# Patient Record
Sex: MALE | Race: ASIAN | URBAN - METROPOLITAN AREA
[De-identification: names, ages, dates, MRNs, and addresses within clinical notes are randomized per-mention and may not be internally consistent; named-entity substitution may affect disease eponyms.]

---
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
## 2024-02-15 ENCOUNTER — TELEMEDICINE (OUTPATIENT)
Dept: FAMILY MEDICINE CLINIC | Facility: CLINIC | Age: 19
End: 2024-02-15
Payer: COMMERCIAL

## 2024-02-15 DIAGNOSIS — G89.29 CHRONIC RIGHT SHOULDER PAIN: Primary | ICD-10-CM

## 2024-02-15 DIAGNOSIS — M25.511 CHRONIC RIGHT SHOULDER PAIN: Primary | ICD-10-CM

## 2024-02-15 NOTE — PROGRESS NOTES
Subjective:   Patient ID: Jony Grimes is a 19 year old male.    Pain in left shoulder x 1 year. Pain with working out, pain with raising the arm.  Problem has been worsening.      History/Other:   Review of Systems   All other systems reviewed and are negative.    Current Outpatient Medications   Medication Sig Dispense Refill    Fluticasone Propionate 50 MCG/ACT Nasal Suspension 2 sprays by Each Nare route daily. 1 each 1     Allergies:No Known Allergies    Objective:   Physical Exam  Video visit    Assessment & Plan:   1. Chronic right shoulder pain      Video visit  1. Chronic right shoulder pain  - Physical Therapy Referral - External    Meds This Visit:  Requested Prescriptions      No prescriptions requested or ordered in this encounter     Imaging & Referrals:  PHYSICAL THERAPY EXTERNAL

## 2024-02-20 ENCOUNTER — TELEPHONE (OUTPATIENT)
Dept: FAMILY MEDICINE CLINIC | Facility: CLINIC | Age: 19
End: 2024-02-20

## 2024-02-20 DIAGNOSIS — G89.29 CHRONIC RIGHT SHOULDER PAIN: Primary | ICD-10-CM

## 2024-02-20 DIAGNOSIS — M25.511 CHRONIC RIGHT SHOULDER PAIN: Primary | ICD-10-CM

## 2024-02-20 NOTE — TELEPHONE ENCOUNTER
Order placed after [facility] was not an approved facility by insurance company    Mother notified.

## 2024-02-23 ENCOUNTER — TELEPHONE (OUTPATIENT)
Dept: PHYSICAL THERAPY | Facility: HOSPITAL | Age: 19
End: 2024-02-23

## 2024-02-27 ENCOUNTER — TELEPHONE (OUTPATIENT)
Dept: PHYSICAL THERAPY | Facility: HOSPITAL | Age: 19
End: 2024-02-27

## 2024-03-01 ENCOUNTER — OFFICE VISIT (OUTPATIENT)
Facility: LOCATION | Age: 19
End: 2024-03-01
Attending: FAMILY MEDICINE
Payer: COMMERCIAL

## 2024-03-01 DIAGNOSIS — G89.29 CHRONIC RIGHT SHOULDER PAIN: Primary | ICD-10-CM

## 2024-03-01 DIAGNOSIS — M25.511 CHRONIC RIGHT SHOULDER PAIN: Primary | ICD-10-CM

## 2024-03-01 PROCEDURE — 97110 THERAPEUTIC EXERCISES: CPT

## 2024-03-01 PROCEDURE — 97162 PT EVAL MOD COMPLEX 30 MIN: CPT

## 2024-03-01 NOTE — PROGRESS NOTES
INITIAL EVALUATION:   Referring Physician: Dr. Riley  Diagnosis: Chronic right shoulder pain (M25.511,G89.29)        Date of Service: 3/1/2024     PATIENT SUMMARY/ASSESSMENT   Jony Grimes is a 19 year old y/o male who presents to therapy today with complaints of left shoulder pain. He reports initial onset of symptoms approximately 1 year ago. He reports he had general soreness/pain in the left shoulder with exercise/lifting weights and had one episode of sharper pain while working out.    Pain: Present 0/10  Best 0/10  Worst 8/10  The patient reports intermittent complaints of anterior left shoulder pain. He reports his symptoms are aggravated with lifting weights overhead, reaching behind his back. He reports his symptoms are eased with rest    Jony Grimes presents with impaired joint mobility, motor function, muscle performance, and range of motion associated with right shoulder pain  There are 1 personal factors and co-morbidities influencing plan of care, including chronic nature of symptoms, making treatment more difficult  There are 3 or more Body Structures/Functions, Activity Limitations and Participation Restrictions, including poor posture, decreased scapular strength, difficulty with overhead pressing  Clinical Presentation: Evolving   Jony describes prior level of function as independent with ADLs, self-care tasks and exercising without significant complaints of pain. Pt goals include decreasing his complaints of pain and returning to his previous level of function.  Past medical history was reviewed with Jony and there are no significant findings  Jony would benefit from skilled Physical Therapy to address the above impairments to decrease complaints of pain and facilitate return to previous level of function    Precautions:  None  OBJECTIVE:   Observation/Posture: The patient sits with poor posture with a forward head and rounded shoulders    Palpation: Moderate tenderness with palpation of the  long head of the biceps tendon    ROM: Seated shoulder AROM WFL with minimal pain noted with active shoulder abduction    Accessory motion: No significant hypomobility with GH joint AP/inferior glides    Flexibility: Minimal limitations in bilateral pectorals    Strength/MMT: Shoulder flexors 5/5, Shoulder abductors 5/5, Shoulder ER 5/5, Shoulder IR 5/5, Biceps 5/5, Triceps 5/5    Special tests: Speeds test positive, Empty can negative, Sanders Fran negative    QuickDASH Outcome Score  Score: 36.36 % (3/1/2024  2:54 PM)       Today’s Treatment and Response: Reviewed plan of care and role of physical therapy. Performed S/L sleeper stretch, prone scap retraction, Theraband shoulder ER and reviewed importance of posture and limiting painful activities/exercises    Charges: PT Eval Moderate complexity x1, TherEx x 1      Total Timed Treatment: 10 min     Total Treatment Time: 35 min     PLAN OF CARE:    Goals:  (To be met in 8 visits)  Patient to demonstrate independence and compliance with comprehensive home exercise program   Patient to demonstrate improved postural awareness, requiring no cueing during treatment session   Patient to improve QuickDash score to be better than 10%  Patient to report returning to regular exercise routine without complaints of shoulder pain    Frequency / Duration: Patient will be seen for 1-2 x/week or a total of 8 visits over a 90 day period. Treatment will include: Manual Therapy; Therapeutic Exercises; Neuromuscular Re-education; Therapeutic Activity; Electrical Stim; Ultrasound;     Education or treatment limitation: None  Rehab Potential:good      Patient/Family/Caregiver was advised of these findings, precautions, and treatment options and has agreed to actively participate in planning and for this course of care.    Thank you for your referral. Please co-sign or sign and return this letter via fax as soon as possible to [phone]. If you have any questions, please contact me  at Dept: [phone]    Sincerely,  Electronically signed by therapist: Raheem Taylor PT    Physician's certification required: Yes  I certify the need for these services furnished under this plan of treatment and while under my care.    X___________________________________________________ Date____________________    Certification From: 3/1/2024  To:5/30/2024

## 2024-03-08 ENCOUNTER — TELEPHONE (OUTPATIENT)
Dept: PHYSICAL THERAPY | Facility: HOSPITAL | Age: 19
End: 2024-03-08

## 2024-03-08 ENCOUNTER — APPOINTMENT (OUTPATIENT)
Facility: LOCATION | Age: 19
End: 2024-03-08
Attending: FAMILY MEDICINE
Payer: COMMERCIAL

## 2024-03-15 ENCOUNTER — TELEMEDICINE (OUTPATIENT)
Dept: FAMILY MEDICINE CLINIC | Facility: CLINIC | Age: 19
End: 2024-03-15
Payer: COMMERCIAL

## 2024-03-15 DIAGNOSIS — G89.29 CHRONIC PAIN OF BOTH SHOULDERS: ICD-10-CM

## 2024-03-15 DIAGNOSIS — R29.898 WEAKNESS OF LEFT SHOULDER: Primary | ICD-10-CM

## 2024-03-15 DIAGNOSIS — M25.511 CHRONIC PAIN OF BOTH SHOULDERS: ICD-10-CM

## 2024-03-15 DIAGNOSIS — L65.9 HAIR THINNING: ICD-10-CM

## 2024-03-15 DIAGNOSIS — M25.512 CHRONIC PAIN OF BOTH SHOULDERS: ICD-10-CM

## 2024-03-15 PROCEDURE — 99214 OFFICE O/P EST MOD 30 MIN: CPT | Performed by: FAMILY MEDICINE

## 2024-03-15 NOTE — PROGRESS NOTES
Subjective:   Patient ID: Jony Grimes is a 19 year old male.    Bilateral shoulder pain, worse on left.  Chronic.  Summer 2022 was doing shoulder presses on cruise and boat moved.  He had a crack/pop.  And since then has been having this pain.  + weakness in shoulder.    + hair thinning.    History/Other:   Review of Systems   All other systems reviewed and are negative.    Current Outpatient Medications   Medication Sig Dispense Refill    Fluticasone Propionate 50 MCG/ACT Nasal Suspension 2 sprays by Each Nare route daily. 1 each 1     Allergies:No Known Allergies    Objective:   Physical Exam  Video visit    Assessment & Plan:   1. Weakness of left shoulder    2. Chronic pain of both shoulders    3. Hair thinning      Video visit  Shoulder injury, chronic pain, weakness.  Has tried PT.  Should get MRI.  Get blood test dont around 8am  Use rogaine  Refer do dermatologist if concerns or not improving.    Meds This Visit:  Requested Prescriptions      No prescriptions requested or ordered in this encounter       Imaging & Referrals:  DERM - INTERNAL  MRI SHOULDER, LEFT (CPT=73221)

## 2024-05-09 ENCOUNTER — OFFICE VISIT (OUTPATIENT)
Dept: FAMILY MEDICINE CLINIC | Facility: CLINIC | Age: 19
End: 2024-05-09
Payer: COMMERCIAL

## 2024-05-09 VITALS
BODY MASS INDEX: 25.23 KG/M2 | RESPIRATION RATE: 18 BRPM | SYSTOLIC BLOOD PRESSURE: 112 MMHG | WEIGHT: 157 LBS | HEART RATE: 67 BPM | HEIGHT: 66 IN | DIASTOLIC BLOOD PRESSURE: 70 MMHG | OXYGEN SATURATION: 99 %

## 2024-05-09 DIAGNOSIS — M25.512 CHRONIC LEFT SHOULDER PAIN: ICD-10-CM

## 2024-05-09 DIAGNOSIS — R29.898 WEAKNESS OF LEFT SHOULDER: Primary | ICD-10-CM

## 2024-05-09 DIAGNOSIS — G89.29 CHRONIC LEFT SHOULDER PAIN: ICD-10-CM

## 2024-05-09 PROCEDURE — 3074F SYST BP LT 130 MM HG: CPT | Performed by: FAMILY MEDICINE

## 2024-05-09 PROCEDURE — 3078F DIAST BP <80 MM HG: CPT | Performed by: FAMILY MEDICINE

## 2024-05-09 PROCEDURE — 3008F BODY MASS INDEX DOCD: CPT | Performed by: FAMILY MEDICINE

## 2024-05-09 PROCEDURE — 99213 OFFICE O/P EST LOW 20 MIN: CPT | Performed by: FAMILY MEDICINE

## 2024-05-09 NOTE — PROGRESS NOTES
Subjective:   Patient ID: Jony Grimes is a 19 year old male.    Left shoulder pain chronic.  Creaking in b/l shoulder.  Was lifting while on cruise and ship tilted, and he heard a crack.  And then kept lifting.  Now improving with rest.  Still hurts if he goes hard.  Has dont PT.        History/Other:   Review of Systems   All other systems reviewed and are negative.    No current outpatient medications on file.     Allergies:No Known Allergies    Objective:   Physical Exam  Vitals reviewed.   Constitutional:       General: He is not in acute distress.     Appearance: He is well-developed. He is not diaphoretic.   Eyes:      General: No scleral icterus.        Right eye: No discharge.         Left eye: No discharge.      Conjunctiva/sclera: Conjunctivae normal.   Cardiovascular:      Rate and Rhythm: Normal rate and regular rhythm.      Heart sounds: Normal heart sounds.   Pulmonary:      Effort: Pulmonary effort is normal. No respiratory distress.      Breath sounds: Normal breath sounds. No wheezing or rales.   Genitourinary:     Comments: Rotator cuff testing normal.  Creaking and pain with compression testing into labrum.        Assessment & Plan:   1. Weakness of left shoulder    2. Chronic left shoulder pain        Concern for possible labrum tear  Awaiting MRI results  Rest, ice, gentle stretching and strengthening.    Meds This Visit:  Requested Prescriptions      No prescriptions requested or ordered in this encounter       Imaging & Referrals:  None

## 2024-05-21 ENCOUNTER — HOSPITAL ENCOUNTER (OUTPATIENT)
Dept: MRI IMAGING | Facility: HOSPITAL | Age: 19
Discharge: HOME | End: 2024-05-21
Attending: FAMILY MEDICINE

## 2024-05-21 DIAGNOSIS — R29.898 WEAKNESS OF LEFT SHOULDER: ICD-10-CM

## 2024-05-21 DIAGNOSIS — G89.29 CHRONIC PAIN OF BOTH SHOULDERS: ICD-10-CM

## 2024-05-21 DIAGNOSIS — M25.512 CHRONIC PAIN OF BOTH SHOULDERS: ICD-10-CM

## 2024-05-21 DIAGNOSIS — M25.511 CHRONIC PAIN OF BOTH SHOULDERS: ICD-10-CM

## 2024-05-21 PROCEDURE — 73221 MRI JOINT UPR EXTREM W/O DYE: CPT | Performed by: FAMILY MEDICINE

## 2024-05-28 ENCOUNTER — OFFICE VISIT (OUTPATIENT)
Dept: FAMILY MEDICINE CLINIC | Facility: CLINIC | Age: 19
End: 2024-05-28

## 2024-05-28 VITALS
WEIGHT: 153 LBS | OXYGEN SATURATION: 97 % | HEART RATE: 74 BPM | BODY MASS INDEX: 25 KG/M2 | SYSTOLIC BLOOD PRESSURE: 104 MMHG | DIASTOLIC BLOOD PRESSURE: 58 MMHG

## 2024-05-28 DIAGNOSIS — Z71.85 HPV VACCINE COUNSELING: ICD-10-CM

## 2024-05-28 DIAGNOSIS — S46.112A LABRAL TEAR OF LONG HEAD OF BICEPS TENDON, LEFT, INITIAL ENCOUNTER: Primary | ICD-10-CM

## 2024-05-28 DIAGNOSIS — N47.1 PHIMOSIS: ICD-10-CM

## 2024-05-28 DIAGNOSIS — J30.2 SEASONAL ALLERGIC RHINITIS, UNSPECIFIED TRIGGER: ICD-10-CM

## 2024-05-28 PROCEDURE — 90471 IMMUNIZATION ADMIN: CPT | Performed by: FAMILY MEDICINE

## 2024-05-28 PROCEDURE — 3078F DIAST BP <80 MM HG: CPT | Performed by: FAMILY MEDICINE

## 2024-05-28 PROCEDURE — 90651 9VHPV VACCINE 2/3 DOSE IM: CPT | Performed by: FAMILY MEDICINE

## 2024-05-28 PROCEDURE — 3074F SYST BP LT 130 MM HG: CPT | Performed by: FAMILY MEDICINE

## 2024-05-28 PROCEDURE — 99214 OFFICE O/P EST MOD 30 MIN: CPT | Performed by: FAMILY MEDICINE

## 2024-05-28 RX ORDER — FLUTICASONE PROPIONATE 50 MCG
2 SPRAY, SUSPENSION (ML) NASAL DAILY
Qty: 1 EACH | Refills: 1 | Status: SHIPPED | OUTPATIENT
Start: 2024-05-28

## 2024-05-28 RX ORDER — AZELASTINE 1 MG/ML
1 SPRAY, METERED NASAL 2 TIMES DAILY
Qty: 1 EACH | Refills: 1 | Status: SHIPPED | OUTPATIENT
Start: 2024-05-28

## 2024-05-29 NOTE — PROGRESS NOTES
Subjective:   Patient ID: Jony Grimes is a 19 year old male.    1. Labral tear of long head of bicep tendon insertion seen on MRI.  + pain with lifting.  Started due to lifting.  No swelling.  Has given it rest and having some improvement in pain.  Able to do pulling exercises but not lifting.    Seasonal allergic rhinitis, unspecified trigger.  Chronic.  Worse in spring.  Doing zyrtec but not helping enough.  No other treatments.    Phimosis.  Chronic.  Not improving.  No pain at this time.  + mild pain with erections.        History/Other:   Review of Systems   All other systems reviewed and are negative.    Current Outpatient Medications   Medication Sig Dispense Refill    fluticasone propionate 50 MCG/ACT Nasal Suspension 2 sprays by Each Nare route daily. 1 each 1    azelastine 0.1 % Nasal Solution 1 spray by Nasal route 2 (two) times daily. 1 each 1     Allergies:No Known Allergies    Objective:   Physical Exam  Vitals reviewed.   Constitutional:       General: He is not in acute distress.     Appearance: He is well-developed. He is not diaphoretic.   Eyes:      General: No scleral icterus.        Right eye: No discharge.         Left eye: No discharge.      Conjunctiva/sclera: Conjunctivae normal.   Cardiovascular:      Rate and Rhythm: Normal rate and regular rhythm.      Heart sounds: Normal heart sounds.   Pulmonary:      Effort: Pulmonary effort is normal. No respiratory distress.      Breath sounds: Normal breath sounds. No wheezing or rales.   Genitourinary:     Comments: + phimosis.  Painful when trying to pull it back.  Musculoskeletal:      Comments: See previous visit         Assessment & Plan:   1. Labral tear of long head of biceps tendon, left, initial encounter    2. Seasonal allergic rhinitis, unspecified trigger    3. HPV vaccine counseling    4. Phimosis      1. Labral tear of long head of biceps tendon, left, initial encounter  - Ortho Referral - In Network    2. Seasonal allergic rhinitis,  unspecified trigger  - fluticasone propionate 50 MCG/ACT Nasal Suspension; 2 sprays by Each Nare route daily.  Dispense: 1 each; Refill: 1  - azelastine 0.1 % Nasal Solution; 1 spray by Nasal route 2 (two) times daily.  Dispense: 1 each; Refill: 1    3. HPV vaccine counseling  - HPV (Gardasil 9)    4. Phimosis  - Urology Referral - In Network    Orders Placed This Encounter   Procedures    HPV (Gardasil 9)       Meds This Visit:  Requested Prescriptions     Signed Prescriptions Disp Refills    fluticasone propionate 50 MCG/ACT Nasal Suspension 1 each 1     Si sprays by Each Nare route daily.    azelastine 0.1 % Nasal Solution 1 each 1     Si spray by Nasal route 2 (two) times daily.       Imaging & Referrals:  ORTHOPEDIC - INTERNAL  UROLOGY - INTERNAL  HPV HUMAN PAPILLOMA VIRUS VACC 9 MORIS 3 DOSE IM

## 2024-06-24 ENCOUNTER — TELEPHONE (OUTPATIENT)
Dept: PHYSICAL THERAPY | Facility: HOSPITAL | Age: 19
End: 2024-06-24

## 2024-06-27 ENCOUNTER — ORDER TRANSCRIPTION (OUTPATIENT)
Dept: PHYSICAL THERAPY | Facility: HOSPITAL | Age: 19
End: 2024-06-27

## 2024-06-27 DIAGNOSIS — S43.432A ANTERIOR TO POSTERIOR TEAR OF SUPERIOR GLENOID LABRUM OF LEFT SHOULDER: Primary | ICD-10-CM

## 2024-07-03 ENCOUNTER — TELEPHONE (OUTPATIENT)
Dept: PHYSICAL THERAPY | Facility: HOSPITAL | Age: 19
End: 2024-07-03

## 2024-07-09 ENCOUNTER — TELEPHONE (OUTPATIENT)
Dept: PHYSICAL THERAPY | Age: 19
End: 2024-07-09

## 2024-07-10 ENCOUNTER — TELEPHONE (OUTPATIENT)
Dept: PHYSICAL THERAPY | Facility: HOSPITAL | Age: 19
End: 2024-07-10

## 2024-07-23 ENCOUNTER — OFFICE VISIT (OUTPATIENT)
Dept: PHYSICAL THERAPY | Age: 19
End: 2024-07-23
Attending: PHYSICIAN ASSISTANT
Payer: COMMERCIAL

## 2024-07-23 DIAGNOSIS — S43.432A ANTERIOR TO POSTERIOR TEAR OF SUPERIOR GLENOID LABRUM OF LEFT SHOULDER: Primary | ICD-10-CM

## 2024-07-23 PROCEDURE — 97110 THERAPEUTIC EXERCISES: CPT

## 2024-07-23 PROCEDURE — 97161 PT EVAL LOW COMPLEX 20 MIN: CPT

## 2024-07-23 NOTE — PROGRESS NOTES
SHOULDER EVALUATION:     Diagnosis:   Anterior to posterior tear of superior glenoid labrum of left shoulder (S43.432A)       Referring Provider: Dre Bowens  Date of Evaluation:    7/23/2024    Precautions:  None Next MD visit:   none scheduled  Date of Surgery: n/a     PATIENT SUMMARY   Jony Grimes is a 19 year old male who presents to therapy today with complaints of left shoulder pain x1-2 years.  States that he was working out with weights. Squatting with a 225# bar behind his back and his shoulders would be really sore afterwards. Overtime it started to hurt to lift even low weights (30#). Went to see orthopedic> MRA: showed SLAP tear. Referral to OP PT. Has been using right arm more for activities and right shoulder has been more sore recently    Pt describes pain level current 0/10, at best 0/10, at worst 8/10.   Current functional limitations include difficulty with squatting with a bar, swimming and stroke, washing back in shower, throwing/shooting basketball.     Jony describes prior level of function declining use of left shoulder for lifting. Pt goals include get shoulder back to normal and workout without  pain.  Past medical history was reviewed with Jony. Significant findings include No past medical history on file.      ASSESSMENT  Jony presents to physical therapy evaluation with primary c/o bilat sh pain L>R. The results of the objective tests and measures show impaired posture, flexibility, ROM, joint mobility, muscle performance and motor function.  Functional deficits include but are not limited to difficulty with squatting with a bar, swimming and stroke, washing back in shower, throwing/shooting basketball.  Signs and symptoms are consistent with diagnosis of left  shoulder SLAP tear. Pt and PT discussed evaluation findings, pathology, POC and HEP.  Pt voiced understanding and performs HEP correctly without reported pain. Skilled Physical Therapy is medically necessary to address  the above impairments and reach functional goals.     OBJECTIVE:   Observation/Posture: rounded shoulder posture  Palpation: mild bicep tenderness  Sensation: Sensation intact to light touch.    AROM: (* denotes performed with pain)  Shoulder  Elbow   Flexion: L 168 deg; R 168 deg  Abduction: L 170 deg; R 180 deg  ER: L 85 deg; R 90 deg  IR: L T5; R T6 WNL     Accessory motion: GHJ: slight hypomobility AP    Flexibility: pectorals: Moderate restrictions    Strength/MMT: (* denotes performed with pain)  Shoulder Elbow Scapular   Flexion: R 5/5; L 5/5  Abduction: R 5/5; L 5/5  ER: R 5/5; L 4+/5  IR: R 5/5; L 4+/5 Flexion: R 5/5; L 5/5  Extension: R 5/5; L 5/5   Rhomboids: R4+/5, L 4+/5  Mid trap: R 4+/5; L 4+/5        Special tests:   NT due to recent MRI    Today’s Treatment and Response: PT eval. HEP taught, performed and issued  Pt education was provided on exam findings, treatment diagnosis, treatment plan, expectations, and prognosis. Pt was also provided recommendations for activity modifications, possible soreness after evaluation, modalities as needed [ice/heat], postural corrections, and ergonomics.  Patient was instructed in and issued a HEP for:   Access Code: [email or web address]  URL: [email or web address]  Date: 07/23/2024  Prepared by: Liz Riley    Exercises  - Full Plank with Shoulder Taps  - 1 x daily - 7 x weekly - 3 sets - 10 reps  - Standing Wall Ball Circles with Mini Swiss Ball  - 1 x daily - 7 x weekly - 1 sets - 20 reps  - Standing Wall Ball Circles in Scaption with Mini Swiss Ball  - 1 x daily - 7 x weekly - 20 reps  - Shoulder Flexion Wall Slide with Resistance Band  - 1-2 x daily - 7 x weekly - 1 sets - 10 reps - 2 sec hold  - Wall Push Up  - 1-2 x daily - 7 x weekly - 1 sets - 10 reps - 2 sec hold  - Horizontal Wall Walk with Resistance  - 1 x daily - 7 x weekly    Charges: PT Eval Low Complexity, Ex 1      Total Timed Treatment: 15 min     Total Treatment Time: 45 min     Based on  clinical rationale and outcome measures, this evaluation involved Low Complexity decision making   PLAN OF CARE:    Goals: (to be met in 10 visits)   Pt will report improved ability to sleep without waking due to shoulder pain   Pt will improve shoulder abduction AROM to >180 degrees to improve ability to don deodorant, don/doff shirts, and wash hair   Pt will increase shoulder AROM ER to 90 deg to be able to reach and fasten seatbelt   Pt will improve shoulder strength throughout to 5/5 to improve function with overhead lifting   Pt will demonstrate increased mid/low trap strength to 5-/5 to promote improved shoulder mechanics and stabilization with lifting and reaching   Pt will be independent and compliant with comprehensive HEP to maintain progress achieved in PT       Frequency / Duration: Patient will be seen for 1-2 x/week or a total of 10 visits over a 90 day period. Treatment will include: Manual Therapy, Neuromuscular Re-education, Therapeutic Exercise, and Home Exercise Program instruction    Education or treatment limitation: None  Rehab Potential:good    QuickDASH Outcome Score  Score: 36.36 % (7/23/2024  2:47 PM)      Patient/Family/Caregiver was advised of these findings, precautions, and treatment options and has agreed to actively participate in planning and for this course of care.    Thank you for your referral. Please co-sign or sign and return this letter via fax as soon as possible to [phone]. If you have any questions, please contact me at Dept: [phone]    Sincerely,  Electronically signed by therapist: Liz Riley, PT  Physician's certification required: Yes  I certify the need for these services furnished under this plan of treatment and while under my care.    X___________________________________________________ Date____________________    Certification From: 7/23/2024  To:10/21/2024

## 2024-07-23 NOTE — PATIENT INSTRUCTIONS
Access Code: [email or web address]  URL: [email or web address]  Date: 07/23/2024  Prepared by: Liz Riley    Exercises  - Full Plank with Shoulder Taps  - 1 x daily - 7 x weekly - 3 sets - 10 reps  - Standing Wall Ball Circles with Mini Swiss Ball  - 1 x daily - 7 x weekly - 1 sets - 20 reps  - Standing Wall Ball Circles in Scaption with Mini Swiss Ball  - 1 x daily - 7 x weekly - 20 reps  - Shoulder Flexion Wall Slide with Resistance Band  - 1-2 x daily - 7 x weekly - 1 sets - 10 reps - 2 sec hold  - Wall Push Up  - 1-2 x daily - 7 x weekly - 1 sets - 10 reps - 2 sec hold  - Horizontal Wall Walk with Resistance  - 1 x daily - 7 x weekly

## 2024-07-30 ENCOUNTER — APPOINTMENT (OUTPATIENT)
Dept: PHYSICAL THERAPY | Age: 19
End: 2024-07-30
Attending: PHYSICIAN ASSISTANT
Payer: COMMERCIAL

## 2024-08-05 ENCOUNTER — OFFICE VISIT (OUTPATIENT)
Dept: PHYSICAL THERAPY | Age: 19
End: 2024-08-05
Attending: PHYSICIAN ASSISTANT
Payer: COMMERCIAL

## 2024-08-05 PROCEDURE — 97110 THERAPEUTIC EXERCISES: CPT

## 2024-08-05 NOTE — PROGRESS NOTES
Diagnosis:   Anterior to posterior tear of superior glenoid labrum of left shoulder (S43.432A)         Referring Provider: Dre Bowens  Date of Evaluation:    7/23/2024    Precautions:  None Next MD visit:   none scheduled  Date of Surgery: n/a   Insurance Primary/Secondary: [employer] [place] / N/A     # Auth Visits: 5 authorized            Subjective: no new problems. States that he is working on his HEP 1x/day. Shoulder does feel a little better overall.     Pain: 0/10      Objective:   AROM: (* denotes performed with pain)  Shoulder    Flexion: L 168 deg; R 168 deg  Abduction: L 170 deg; R 180 deg  ER: L 85 deg; R 90 deg  IR: L T5; R T6     Strength/MMT: (* denotes performed with pain)  Shoulder Elbow Scapular   Flexion: R 5/5; L 5/5  Abduction: R 5/5; L 5/5  ER: R 5/5; L 4+/5  IR: R 5/5; L 4+/5 Flexion: R 5/5; L 5/5  Extension: R 5/5; L 5/5    Rhomboids: R4+/5, L 4+/5  Mid trap: R 4+/5; L 4+/5         Assessment: completed todays treatment without c/o increased pain or discomfort following treatment. Reviewed HEP and pt required cues for proper form with wall push ups, modified plank alt shoulder taps to perform on knees due to increased difficulty in plank. PT works out at gym 4-5 days/week, taught TRX low and high rows--added to HEP so he can work on at the gym.      Goals:    (to be met in 10 visits)   Pt will report improved ability to sleep without waking due to shoulder pain   Pt will improve shoulder abduction AROM to >180 degrees to improve ability to don deodorant, don/doff shirts, and wash hair   Pt will increase shoulder AROM ER to 90 deg to be able to reach and fasten seatbelt   Pt will improve shoulder strength throughout to 5/5 to improve function with overhead lifting   Pt will demonstrate increased mid/low trap strength to 5-/5 to promote improved shoulder mechanics and stabilization with lifting and reaching   Pt will be independent and compliant with comprehensive HEP to maintain progress  achieved in PT    Plan: continue POC. Progress as tolerated  Date: 8/5/2024  TX#: 2/10 Date:                 TX#: 3/ Date:                 TX#: 4/ Date:                 TX#: 5/ Date:   Tx#: 6/   UBE x4 min       TRX low rows 2x10  TRX high rows 2x10       Wall press ups YSB x15       Serratus wall slide YTB x10       Serratus lateral wall walking YTB 10ft x 2       Wall circles CW/CCW 2x10 ea in flexion and abd       Quadruped: L/R CW/CCW circles with gliders x10 CW/CCW       Quadruped  alt sh taps x10 ea       GTB R/L sh IR/ER x10 ea       HEP: Access Code: [email or web address]  URL: [email or web address]  Date: 07/23/2024  Prepared by: Liz Riley     Exercises  - Full Plank with Shoulder Taps  - 1 x daily - 7 x weekly - 3 sets - 10 reps  - Standing Wall Ball Circles with Mini Swiss Ball  - 1 x daily - 7 x weekly - 1 sets - 20 reps  - Standing Wall Ball Circles in Scaption with Mini Swiss Ball  - 1 x daily - 7 x weekly - 20 reps  - Shoulder Flexion Wall Slide with Resistance Band  - 1-2 x daily - 7 x weekly - 1 sets - 10 reps - 2 sec hold  - Wall Push Up  - 1-2 x daily - 7 x weekly - 1 sets - 10 reps - 2 sec hold  - Horizontal Wall Walk with Resistance  - 1 x daily - 7 x weekly     8/5/2024  TRX low row  TRX high row    Charges: Ex 3 (45)       Total Timed Treatment: 45 min  Total Treatment Time: 45 min

## 2024-08-07 ENCOUNTER — APPOINTMENT (OUTPATIENT)
Dept: PHYSICAL THERAPY | Age: 19
End: 2024-08-07
Attending: PHYSICIAN ASSISTANT
Payer: COMMERCIAL

## 2024-08-12 ENCOUNTER — APPOINTMENT (OUTPATIENT)
Dept: PHYSICAL THERAPY | Age: 19
End: 2024-08-12
Attending: PHYSICIAN ASSISTANT
Payer: COMMERCIAL

## 2024-08-14 ENCOUNTER — TELEPHONE (OUTPATIENT)
Dept: FAMILY MEDICINE CLINIC | Facility: CLINIC | Age: 19
End: 2024-08-14

## 2024-08-14 DIAGNOSIS — L65.9 HAIR THINNING: Primary | ICD-10-CM

## 2024-08-14 NOTE — TELEPHONE ENCOUNTER
Please review previous message  Pt requesting script for finasteride for hail loss.  Last ov 5/28/24

## 2024-08-14 NOTE — TELEPHONE ENCOUNTER
Pt requesting finasteride script to be sent to [facility] on [address].  Pt said this has been discussed in the past for hair loss.  Pls advise

## 2024-08-16 RX ORDER — FINASTERIDE 1 MG/1
1 TABLET, FILM COATED ORAL DAILY
Qty: 90 TABLET | Refills: 3 | Status: SHIPPED | OUTPATIENT
Start: 2024-08-16

## 2024-09-05 ENCOUNTER — TELEPHONE (OUTPATIENT)
Dept: PHYSICAL THERAPY | Facility: HOSPITAL | Age: 19
End: 2024-09-05

## 2024-09-05 ENCOUNTER — APPOINTMENT (OUTPATIENT)
Dept: PHYSICAL THERAPY | Age: 19
End: 2024-09-05
Attending: PHYSICIAN ASSISTANT
Payer: COMMERCIAL

## 2024-12-17 ENCOUNTER — PATIENT MESSAGE (OUTPATIENT)
Dept: FAMILY MEDICINE CLINIC | Facility: CLINIC | Age: 19
End: 2024-12-17

## 2025-01-30 ENCOUNTER — TELEPHONE (OUTPATIENT)
Dept: FAMILY MEDICINE CLINIC | Facility: CLINIC | Age: 20
End: 2025-01-30

## 2025-01-30 NOTE — TELEPHONE ENCOUNTER
Patients mother called to report patients symptoms:  Severe cough, throwing up, nose is blocked.     She is requesting an appointment today or tomorrow but I told her we do not have availability with [facility] at this time. She wanted us to update [facility] on patients symptoms

## 2025-01-30 NOTE — TELEPHONE ENCOUNTER
Spoke to pt mom  Pt started yesterday with cough, sinus congestion, vomiting    [facility], verbal understanding

## 2025-03-10 ENCOUNTER — TELEPHONE (OUTPATIENT)
Dept: FAMILY MEDICINE CLINIC | Facility: CLINIC | Age: 20
End: 2025-03-10

## 2025-03-10 NOTE — TELEPHONE ENCOUNTER
Spoke to mother, states 3 weeks ago, pt had cold/fever sx, did not test for covid at that time, but did test this past weekend and it was negative.  Has lost sense of taste x 2weeks. Requesting appt with Osvaldo Mckeon.  Add on?

## 2025-03-10 NOTE — TELEPHONE ENCOUNTER
Mom asking for pt to be added to Dr. Riley's sched - he has lost the sense of taste for the last 2 wks.  Dr. Riley has no openings for the remainder of March and mom does not want to wait.  Pls advise

## 2025-03-11 NOTE — TELEPHONE ENCOUNTER
Ok to see him Monday but most likely COVID and we will have to wait and see, but have him come in and I can examine and see if he needs any testing.

## 2025-03-11 NOTE — TELEPHONE ENCOUNTER
LM for pt to cb--mom is not on HIPAA  See below.  There is a fast pass opening so far on Friday avail but not sure for how long

## 2025-06-19 ENCOUNTER — OFFICE VISIT (OUTPATIENT)
Dept: FAMILY MEDICINE CLINIC | Facility: CLINIC | Age: 20
End: 2025-06-19
Payer: COMMERCIAL

## 2025-06-19 VITALS
HEART RATE: 72 BPM | BODY MASS INDEX: 23.78 KG/M2 | RESPIRATION RATE: 18 BRPM | HEIGHT: 66 IN | DIASTOLIC BLOOD PRESSURE: 70 MMHG | SYSTOLIC BLOOD PRESSURE: 112 MMHG | WEIGHT: 148 LBS | OXYGEN SATURATION: 98 %

## 2025-06-19 DIAGNOSIS — Z00.00 ANNUAL PHYSICAL EXAM: Primary | ICD-10-CM

## 2025-06-19 DIAGNOSIS — E55.9 VITAMIN D DEFICIENCY: ICD-10-CM

## 2025-06-19 NOTE — H&P
The 21st Century Cures Act makes medical notes like these available to patients in the interest of transparency. Please be advised this is a medical document. Medical documents are intended to carry relevant information, facts as evident, and the clinical opinion of the practitioner. The medical note is intended as peer to peer communication and may appear blunt or direct. It is written in medical language and may contain abbreviations or verbiage that are unfamiliar.     Jony Grimes is a 20 year old male who presents for a complete physical exam.   HPI:   Pt complains of allergy with the cat's fur,chronic, no new symptoms, he is having cat.    + vit D def.  Not taking vit D.  No fractures.  Mild fatigue.         Current Medications[1]   Past Medical History[2]   Past Surgical History[3]   Family History[4]   Social History:  Short Social Hx on File[5]        REVIEW OF SYSTEMS:   GENERAL: feels well otherwise  SKIN: denies any unusual skin lesions  EYES:denies blurred vision or double vision  HEENT: denies nasal congestion, sinus pain or ST, denies changes in hearing or vision  LUNGS: denies shortness of breath with exertion or frequent cough  CV: denies chest pain, pressure or palpitations  GI: denies abdominal pain,denies heartburn, denies chronic diarrhea or constipation  : denies nocturia or changes in stream, denies erectile dysfunction  MS: denies back pain, arthralgias or myalgias  NEURO: denies headaches or dizziness  PSYCH: denies depression or anxiety  HEMATOLOGIC: denies hx of anemia, easy bruising or bleeding  ENDOCRINE:denies frequent thirst or urination, denies unintentional weight gain/loss  ALL/ASTHMA: denies hx of allergy or asthma    EXAM:   /70   Pulse 72   Resp 18   Ht 5' 6\" (1.676 m)   Wt 148 lb   SpO2 98%   BMI 23.89 kg/m²   Body mass index is 23.89 kg/m².     GENERAL: well developed, well nourished,in no apparent distress  SKIN: no rashes,no suspicious lesions  HEENT:  atraumatic, normocephalic, PERRLA, conjunctiva clear, TMs clear, posterior pharynx clear, no congestion  NECK: supple,no adenopathy,no thyromegaly  LUNGS: clear to auscultation, easy breathing  CV: S1S2, RRR without murmur  GI: good BS's;no masses, HSM or tenderness  : two descended testes,no scrotal tenderness or mass, normal penis no lesions, no hernia  MS: Ryley, no bony deformities, gait normal  EXT: no cyanosis, clubbing or edema  NEURO: Oriented times three, strength 5/5 x 4 ext, LE DTRs 2+  PSYCH: mood and affect appropriate    ASSESSMENT AND PLAN:   Jony Grimes is a 20 year old male who presents for a complete physical exam. Recommended heart healthy diet, routine exercise, and annual flu vaccines.  Routine testicular exams reinforced. The patient indicates understanding of these issues and agrees to the plan.  1. Annual physical exam  - Lipid Panel; Future  - CBC With Differential With Platelet; Future  - Comp Metabolic Panel (14); Future  - TSH W Reflex To Free T4; Future  - Vitamin D; Future    2. Vitamin D deficiency  - Vitamin D; Future    Follow up in 1 year.    Orders Placed This Encounter   Procedures    Lipid Panel     Standing Status:   Future     Expected Date:   6/19/2025     Expiration Date:   6/19/2026    CBC With Differential With Platelet     Standing Status:   Future     Expected Date:   6/19/2025     Expiration Date:   6/19/2026    Comp Metabolic Panel (14)     Standing Status:   Future     Expected Date:   6/19/2025     Expiration Date:   6/19/2026    TSH W Reflex To Free T4     Standing Status:   Future     Expected Date:   6/19/2025     Expiration Date:   6/19/2026    Vitamin D     Standing Status:   Future     Expected Date:   6/19/2025     Expiration Date:   6/19/2026     Please pick the scenario that best fits the purpose for ordering this test:   General Screening/Vit D deficiency (25-Hydroxy)     Release to patient:   Immediate             [1]   Current Outpatient Medications    Medication Sig Dispense Refill    fluticasone propionate 50 MCG/ACT Nasal Suspension 2 sprays by Each Nare route daily. 1 each 1    azelastine 0.1 % Nasal Solution 1 spray by Nasal route 2 (two) times daily. 1 each 1   [2] History reviewed. No pertinent past medical history.  [3] History reviewed. No pertinent surgical history.  [4] History reviewed. No pertinent family history.  [5]   Social History  Socioeconomic History    Marital status: Single   Tobacco Use    Smoking status: Never    Smokeless tobacco: Never   Vaping Use    Vaping status: Never Used   Substance and Sexual Activity    Alcohol use: No     Alcohol/week: 0.0 standard drinks of alcohol    Drug use: No

## 2025-08-07 ENCOUNTER — TELEPHONE (OUTPATIENT)
Dept: FAMILY MEDICINE CLINIC | Facility: CLINIC | Age: 20
End: 2025-08-07

## 2025-08-07 DIAGNOSIS — Z23 NEED FOR VACCINATION: Primary | ICD-10-CM

## 2025-08-12 ENCOUNTER — NURSE ONLY (OUTPATIENT)
Dept: FAMILY MEDICINE CLINIC | Facility: CLINIC | Age: 20
End: 2025-08-12

## 2025-08-12 PROCEDURE — 90471 IMMUNIZATION ADMIN: CPT | Performed by: FAMILY MEDICINE

## 2025-08-12 PROCEDURE — 90715 TDAP VACCINE 7 YRS/> IM: CPT | Performed by: FAMILY MEDICINE

## 2025-08-21 ENCOUNTER — E-VISIT (OUTPATIENT)
Dept: TELEHEALTH | Age: 20
End: 2025-08-21

## 2025-08-21 DIAGNOSIS — Z02.89 ENCOUNTER TO OBTAIN EXCUSE FROM WORK: ICD-10-CM

## 2025-08-21 DIAGNOSIS — J22 ACUTE RESPIRATORY INFECTION: Primary | ICD-10-CM

## (undated) NOTE — LETTER
10/24/19        Tete German  [address] [facility]      Dear Sofie Ware,    [address] [facility] records indicate that you have outstanding lab work and or testing that was ordered for you and has not yet been completed:  Orders Placed This Encounter

## (undated) NOTE — LETTER
[facility] [employer] of Child Health Examination       Student's Name  David Ellington Birth Date Title                           Date     Signature COMPLETED AND SIGNED BY PARENT/GUARDIAN AND VERIFIED BY HEALTH CARE PROVIDER    ALLERGIES  (Food, drug, insect, other)  Patient has no known allergies.  MEDICATION  (List all prescribed or taken on a regular basis.)  No current outpatient medications on nayana (Other)   Resp 18   Ht 5' 6\" (1.676 m)   Wt 141 lb   SpO2 98%   BMI 22.76 kg/m²     DIABETES SCREENING  BMI>85% age/sex  No And any two of the following:  Family History No    Ethnic Minority  No          Signs of Insulin Resistance (hypertension, dyslipi Currently Prescribed Asthma Medication:            Quick-relief  medication (e.g. Short Acting Beta Antagonist): No          Controller medication (e.g. inhaled corticosteroid):   No Other   NEEDS/MODIFICATIONS required in the school setting  None DIETARY

## (undated) NOTE — LETTER
Date: 12/3/2019    Patient Name: Mina Liu          To Whom it may concern: This letter has been written at the patient's request. The above patient was seen at the [facility] for treatment of a medical condition.     This patient should

## (undated) NOTE — LETTER
Date: 9/12/2019    Patient Name: Fartun Walter          To Whom it may concern: This letter has been written at the patient's request. The above patient was seen at the [facility] for treatment of a medical condition.     This patient should

## (undated) NOTE — LETTER
Date: 9/30/2019    Patient Name: Chikis Satton          To Whom it may concern: The above patient was seen at the [facility] for treatment of a medical condition.     This patient should be excused from attending school on 09/30/19        Si

## (undated) NOTE — LETTER
Date: 12/12/2019    Patient Name: Ronda Mazariegos          To Whom it may concern: The above patient was seen at the [facility] for treatment of a medical condition. 3  Please allow him handicap parking from 12/12/19 through 12/23/19.   During

## (undated) NOTE — LETTER
Name:  Alisia Hernandez Year:  12th Grade Class: Student ID No.:   Address:  [id number] [place] [address] Phone:  [phone] (home)  :  15year old   Name Relationship [address] Work Phone Home Phone Mobile Phone   1.  Declan Kirby 12. Has anyone in your family had unexplained fainting, seizures, or near drowning? BONE AND JOINT QUESTIONS Yes No   17. Have you ever had an injury to a bone, muscle, ligament, or tendon that caused you to miss a practice or a game?      18. Have you /fall?     36. Have you ever become ill while exercising in the heat?     41. Do you get frequent muscle cramps when exercising? 42. Do you or someone in your family have sickle cell trait or disease? 43.  Have you ever had any problems with your ey impulse (PMI) Yes    Pulses Yes    Lungs Yes    Abdomen Yes    Genitourinary (males only)* Yes    Skin:  HSV, lesions suggestive of MRSA, tinea corporis Yes    Neurologic* Yes    MUSCULOSKELETAL     Neck Yes    Back Yes    Shoulder/arm Yes    Elbow/forearm my/his/her body either during [employer] state [employer] events or during the school day, and I/our student do/does hereby agree to submit to such testing and analysis by a certified laboratory.  We further understand and agree that the results of the performance-en

## (undated) NOTE — LETTER
[facility] [employer] of Child Health Examination       Student's Name  Jean Davenport Birth Date Title                           Date     Signature HEALTH HISTORY          TO BE COMPLETED AND SIGNED BY PARENT/GUARDIAN AND VERIFIED BY HEALTH CARE PROVIDER    ALLERGIES  (Food, drug, insect, other)  Patient has no known allergies.  MEDICATION  (List all prescribed or taken on a regular basis.)    Current PHYSICAL EXAMINATION REQUIREMENTS    Entire section below to be completed by MD//APN/PA       PHYSICAL EXAMINATION REQUIREMENTS (head circumference if <33 years old):   /74   Pulse 80   Temp 98.1 °F (36.7 °C)   Ht 66\"   Wt 165 lb (74.8 kg)   BMI Throat Yes  Musculoskeletal Yes    Mouth/Dental Yes  Spinal examination Yes    Cardiovascular/HTN Yes  Nutritional status Yes    Respiratory Yes                   Diagnosis of Asthma: No Mental Health Yes        Currently Prescribed Asthma Medication: